# Patient Record
(demographics unavailable — no encounter records)

---

## 2017-09-27 NOTE — ER DOCUMENT REPORT
ED General





- General


Chief Complaint: Vag Bleeding, +preg <12wks


Stated Complaint: CRAMPING,VAGINAL BLEEDING


Time Seen by Provider: 17 23:24


TRAVEL OUTSIDE OF THE [place] IN LAST 30 DAYS: No





- HPI


Notes: 





Patient is a 20-year-old female  who is now a  presents to the ED 

complaining of abdominal cramping and spotting while being approximately 6 

weeks pregnant.  Pt states that she has an occ mild HA as well.  Currently 

patient does not have any cramping.  Patient states that she did have an early 

miscarriage that presented with similar symptoms in the past.  Patient states 

that the spotting began this afternoon, while the cramping began a couple days 

ago.  Patient states that she is still eating and drinking without any 

difficulties.  She has not noticed any trouble urinating or issues with her 

bowel movements.  Patient has not been seen by an OB/GYN, but is scheduled in 2 

weeks for follow-up.  The pain does not radiate otherwise.  Denies any fever, 

head injury, neck pain, URI, sore throat, chest pain, palpitations, syncope, 

cough, shortness of breath, wheeze, dyspnea, nausea/vomiting/diarrhea, urinary 

retention, dysuria, hematuria, loss of control of bowel or bladder, back pain, 

vaginal odor, or rash.  Patient denies any smoking or drug use.





Past Medical History





- Social History


Smoking Status: Never Smoker


Family History: Reviewed & Not Pertinent


Patient has suicidal ideation: No


Patient has homicidal ideation: No


Renal/ Medical History: Denies: Hx Peritoneal Dialysis





Review of Systems





- Review of Systems


Notes: 





REVIEW OF SYSTEMS:


CONSTITUTIONAL :  Denies fever,  chills, or sweats.  Denies recent illness.


EENT:   Denies eye, ear, throat, or mouth pain or symptoms.  Denies nasal or 

sinus congestion or discharge.  Denies throat, tongue, or mouth swelling or 

difficulty swallowing.


CARDIOVASCULAR:  Denies chest pain.  Denies palpitations or racing or irregular 

heart beat.  Denies ankle edema.


RESPIRATORY:  Denies cough, cold, or chest congestion.  Denies shortness of 

breath, difficulty breathing, or wheezing.


GASTROINTESTINAL:  see hpi


GENITOURINARY:  Denies difficulty urinating, painful urination, burning, 

frequency, blood in urine, or discharge.


FEMALE  GENITOURINARY:  see hpi


MUSCULOSKELETAL:  Denies back or neck pain or stiffness.  Denies joint pain or 

swelling.


SKIN:   Denies rash, lesions or sores.


NEUROLOGICAL:  see hpi.  denies confusion or altered mental status.  Denies 

passing out or loss of consciousness.  Denies dizziness or lightheadedness.  

Denies weakness or paralysis or loss of use of either side.  Denies problems 

with gait or speech.  Denies sensory loss, numbness, or tingling.  Denies 

seizures.





ALL OTHER SYSTEMS REVIEWED AND NEGATIVE.








Dictation was performed using Dragon voice recognition software





Physical Exam





- Vital signs


Vitals: 


 











Temp Pulse Resp BP Pulse Ox


 


 98.8 F   73   18   126/64 H  100 


 


 17 22:50  17 22:50  17 22:50  17 22:50  17 22:50











Notes: 





PHYSICAL EXAMINATION:





GENERAL: Well-appearing, well-nourished and in no acute distress.





EYES: Pupils equal round and reactive to light, extraocular movements intact, 

sclera anicteric, conjunctiva are normal.





NECK: Normal range of motion, supple without lymphadenopathy





LUNGS: Breath sounds clear to auscultation bilaterally and equal.  No wheezes 

rales or rhonchi.





HEART: Regular rate and rhythm without murmurs, rubs, gallops.





ABDOMEN: Soft, nontender, nondistended abdomen.  No guarding, no rebound.  No 

masses appreciated.  Normal bowel sounds present.  No CVA tenderness 

bilaterally.





Extremities:  No cyanosis, clubbing, or edema b/l.  Peripheral pulses 2+.  

Capillary refill less than 3 seconds.





NEUROLOGICAL: Cranial nerves grossly intact.  Normal speech, normal gait.  

Normal sensory, motor exams 





PSYCH: Normal mood, normal affect.





SKIN: Warm, Dry, normal turgor, no rashes or lesions noted.





Course





- Re-evaluation


Re-evalutation: 





17 01:26


Patient is an afebrile, well-hydrated, 20-year-old female who presents the ED 

with a living intrauterine pregnancy with a closed cervix, but reported 

spotting and cramping (?threatened ).  Rh+. Vitals are stable.  PE 

otherwise unremarkable.  CBC, CMP, lipase, urinalysis were unremarkable.  Refer 

to the transvaginal ultrasound.  Low suspicion/risk for acute appendicitis, 

bowel obstruction, acute cholecystitis, acute cholangitis, perforated 

diverticulitis, incarcerated hernia, pancreatitis, perforated ulcer, peritonitis

, sepsis, pelvic inflammatory disease, ectopic pregnancy, tubo-ovarian abscess, 

ovarian torsion, or other systemic emergent condition at this time.  Patient is 

aware that her condition can change from initial presentation and she needs to 

monitor symptoms closely and seek medical attention if any acute changes.  

Conservative measures otherwise for symptoms.  Recheck with OBGYN in 1-2 days.  

Recheck with your PCM in 2-3 days.  Return to the ED with any worsening/

concerning symptoms otherwise as reviewed in discharge.  Patient is in 

agreement.








- Vital Signs


Vital signs: 


 











Temp Pulse Resp BP Pulse Ox


 


 98.8 F   73   18   126/64 H  100 


 


 17 22:50  17 22:50  17 22:50  17 22:50  17 22:50














- Laboratory


Result Diagrams: 


 17 23:50





 17 23:50


Laboratory results interpreted by me: 


 











  17





  23:45 23:50 23:50


 


WBC   10.9 H 


 


Beta HCG, Quant    99617.00 H


 


Urine Urobilinogen  2.0 H  


 


Urine Ascorbic Acid  40 H  














Discharge





- Discharge


Clinical Impression: 


 Spotting affecting pregnancy in first trimester, Intrauterine pregnancy





Condition: Stable


Disposition: HOME, SELF-CARE


Instructions:  Bleeding During Early Pregnancy ([facility]), [facility], 

Threatened Miscarriage ([facility]), Follow-Up Care ([facility])


Additional Instructions: 


Monitor symptoms closely for any acute changes or worsening symptoms


Tylenol as needed


Direction per hand-outs


Recheck with an OBGYN in the next 1-2 days*


Recheck with your PCM this week


Return to the ED with any worsening symptoms and/or development of fever, 

headache, chest pain, palpitations, syncope, shortness of breath, trouble 

breathing, abdominal pain, n/v/d, blood in stool/urine, vaginal bleeding/

discharge, or other worsening symptoms that are concerning to you.


Referrals: 


[facility] [Provider Group] - Follow up as needed


OB[facility]GYN [Provider Group] - Follow up tomorrow

## 2017-09-28 NOTE — RADIOLOGY REPORT (SQ)
EXAM DESCRIPTION:  U/S OB TRANSVAG W/DOPPLER



COMPLETED DATE/TIME:  9/28/2017 12:36 am



REASON FOR STUDY:  vaginal bleeding, pregnant



COMPARISON:  None.



TECHNIQUE:  Transvaginal static and realtime grayscale images acquired of the pelvis. Additional lita
cted spectral and color Doppler images recorded. All images stored on PACs.

bHCG: Not available.



LIMITATIONS:  None.



FINDINGS:  FETUS: Living intrauterine pregnancy.

EGA: 6 weeks and 3 days

INGRID: 5/21/2018

FHR: 114  beats per minute.

SUBCHORIONIC BLEED: No.

SIZE OF BLEED: Not applicable.

UTERUS: No masses. No anomalies.

CERVICAL LENGTH: 2.8 cm.   Closed.

RIGHT ADNEXA: Normal 2.5 cm ovary with normal vascular flow.

No adnexal free fluid.

No adnexal masses.

LEFT ADNEXA: Ovary not identified.

No adnexal free fluid.

No adnexal masses.

FREE FLUID: None.

OTHER: No other significant finding.



IMPRESSION:  LIVING INTRAUTERINE PREGNANCY.

EGA 6 weeks 3 days

Trimester of pregnancy:  First - 0 to 13 weeks.



TECHNICAL DOCUMENTATION:  JOB ID:  [id number]

 2011 [employer]- All Rights Reserved